# Patient Record
Sex: MALE | Race: WHITE | NOT HISPANIC OR LATINO | Employment: OTHER | URBAN - METROPOLITAN AREA
[De-identification: names, ages, dates, MRNs, and addresses within clinical notes are randomized per-mention and may not be internally consistent; named-entity substitution may affect disease eponyms.]

---

## 2023-05-05 DIAGNOSIS — R79.89 LOW TESTOSTERONE IN MALE: Primary | ICD-10-CM

## 2023-05-05 RX ORDER — TESTOSTERONE CYPIONATE 200 MG/ML
200 INJECTION, SOLUTION INTRAMUSCULAR
Qty: 5 ML | Refills: 1 | Status: SHIPPED | OUTPATIENT
Start: 2023-05-05 | End: 2023-05-16 | Stop reason: SDUPTHER

## 2023-05-05 RX ORDER — TESTOSTERONE CYPIONATE 200 MG/ML
200 INJECTION, SOLUTION INTRAMUSCULAR
COMMUNITY
End: 2023-05-05 | Stop reason: SDUPTHER

## 2023-05-16 DIAGNOSIS — R79.89 LOW TESTOSTERONE IN MALE: ICD-10-CM

## 2023-05-16 RX ORDER — TADALAFIL 5 MG/1
5 TABLET ORAL DAILY
COMMUNITY
Start: 2022-12-02 | End: 2023-05-16 | Stop reason: SDUPTHER

## 2023-05-16 RX ORDER — TESTOSTERONE CYPIONATE 200 MG/ML
200 INJECTION, SOLUTION INTRAMUSCULAR
Qty: 5 ML | Refills: 1 | Status: SHIPPED | OUTPATIENT
Start: 2023-05-16 | End: 2023-05-18 | Stop reason: SDUPTHER

## 2023-05-16 RX ORDER — TAMSULOSIN HYDROCHLORIDE 0.4 MG/1
0.4 CAPSULE ORAL NIGHTLY
COMMUNITY
End: 2023-05-16 | Stop reason: SDUPTHER

## 2023-05-16 RX ORDER — TADALAFIL 5 MG/1
5 TABLET ORAL DAILY
Qty: 10 TABLET | Refills: 2 | Status: SHIPPED | OUTPATIENT
Start: 2023-05-16 | End: 2023-09-01 | Stop reason: SDUPTHER

## 2023-05-16 RX ORDER — TAMSULOSIN HYDROCHLORIDE 0.4 MG/1
0.4 CAPSULE ORAL NIGHTLY
Qty: 90 CAPSULE | Refills: 1 | Status: SHIPPED | OUTPATIENT
Start: 2023-05-16 | End: 2023-12-29 | Stop reason: SDUPTHER

## 2023-05-18 ENCOUNTER — TELEPHONE (OUTPATIENT)
Dept: PRIMARY CARE | Facility: CLINIC | Age: 53
End: 2023-05-18
Payer: COMMERCIAL

## 2023-05-18 DIAGNOSIS — R79.89 LOW TESTOSTERONE IN MALE: ICD-10-CM

## 2023-05-18 RX ORDER — TESTOSTERONE CYPIONATE 200 MG/ML
INJECTION, SOLUTION INTRAMUSCULAR
Qty: 10 ML | Refills: 1 | Status: SHIPPED | OUTPATIENT
Start: 2023-05-18 | End: 2023-10-16

## 2023-09-01 DIAGNOSIS — R79.89 LOW TESTOSTERONE IN MALE: ICD-10-CM

## 2023-09-01 RX ORDER — TADALAFIL 5 MG/1
5 TABLET ORAL DAILY
Qty: 10 TABLET | Refills: 2 | Status: SHIPPED | OUTPATIENT
Start: 2023-09-01 | End: 2023-12-29 | Stop reason: SDUPTHER

## 2023-10-15 DIAGNOSIS — R79.89 LOW TESTOSTERONE IN MALE: ICD-10-CM

## 2023-10-16 RX ORDER — TESTOSTERONE CYPIONATE 200 MG/ML
INJECTION, SOLUTION INTRAMUSCULAR
Qty: 10 ML | Refills: 0 | Status: SHIPPED | OUTPATIENT
Start: 2023-10-16 | End: 2023-12-29 | Stop reason: SDUPTHER

## 2023-10-25 DIAGNOSIS — E78.5 HYPERLIPIDEMIA, UNSPECIFIED HYPERLIPIDEMIA TYPE: Primary | ICD-10-CM

## 2023-10-25 RX ORDER — ROSUVASTATIN CALCIUM 20 MG/1
20 TABLET, COATED ORAL DAILY
COMMUNITY
End: 2023-10-25 | Stop reason: SDUPTHER

## 2023-10-25 RX ORDER — ROSUVASTATIN CALCIUM 20 MG/1
20 TABLET, COATED ORAL DAILY
Qty: 90 TABLET | Refills: 1 | Status: SHIPPED | OUTPATIENT
Start: 2023-10-25 | End: 2023-12-29 | Stop reason: SDUPTHER

## 2023-12-29 ENCOUNTER — OFFICE VISIT (OUTPATIENT)
Dept: PRIMARY CARE | Facility: CLINIC | Age: 53
End: 2023-12-29
Payer: COMMERCIAL

## 2023-12-29 VITALS
HEIGHT: 70 IN | HEART RATE: 78 BPM | BODY MASS INDEX: 32.35 KG/M2 | TEMPERATURE: 97.5 F | OXYGEN SATURATION: 96 % | WEIGHT: 226 LBS | SYSTOLIC BLOOD PRESSURE: 112 MMHG | DIASTOLIC BLOOD PRESSURE: 68 MMHG

## 2023-12-29 DIAGNOSIS — L30.9 DERMATITIS: ICD-10-CM

## 2023-12-29 DIAGNOSIS — R79.89 LOW TESTOSTERONE IN MALE: ICD-10-CM

## 2023-12-29 DIAGNOSIS — E78.5 HYPERLIPIDEMIA, UNSPECIFIED HYPERLIPIDEMIA TYPE: Primary | ICD-10-CM

## 2023-12-29 DIAGNOSIS — J45.909 REACTIVE AIRWAY DISEASE WITHOUT COMPLICATION, UNSPECIFIED ASTHMA SEVERITY, UNSPECIFIED WHETHER PERSISTENT (HHS-HCC): ICD-10-CM

## 2023-12-29 PROBLEM — S39.012A STRAIN OF LUMBAR REGION: Status: ACTIVE | Noted: 2023-12-29

## 2023-12-29 PROBLEM — N40.0 BPH WITHOUT URINARY OBSTRUCTION: Status: ACTIVE | Noted: 2023-12-29

## 2023-12-29 PROBLEM — E29.1 TESTICULAR HYPOFUNCTION: Status: ACTIVE | Noted: 2023-12-29

## 2023-12-29 PROBLEM — E78.2 HYPERLIPEMIA, MIXED: Status: ACTIVE | Noted: 2023-12-29

## 2023-12-29 PROCEDURE — 99214 OFFICE O/P EST MOD 30 MIN: CPT | Performed by: FAMILY MEDICINE

## 2023-12-29 PROCEDURE — 1036F TOBACCO NON-USER: CPT | Performed by: FAMILY MEDICINE

## 2023-12-29 RX ORDER — TESTOSTERONE CYPIONATE 200 MG/ML
INJECTION, SOLUTION INTRAMUSCULAR
Qty: 10 ML | Refills: 0 | Status: SHIPPED | OUTPATIENT
Start: 2023-12-29 | End: 2024-05-07 | Stop reason: SDUPTHER

## 2023-12-29 RX ORDER — ROSUVASTATIN CALCIUM 20 MG/1
20 TABLET, COATED ORAL DAILY
Qty: 90 TABLET | Refills: 1 | Status: SHIPPED | OUTPATIENT
Start: 2023-12-29

## 2023-12-29 RX ORDER — TADALAFIL 5 MG/1
5 TABLET ORAL DAILY
Qty: 10 TABLET | Refills: 2 | Status: SHIPPED | OUTPATIENT
Start: 2023-12-29

## 2023-12-29 RX ORDER — TAMSULOSIN HYDROCHLORIDE 0.4 MG/1
0.4 CAPSULE ORAL NIGHTLY
Qty: 90 CAPSULE | Refills: 1 | Status: SHIPPED | OUTPATIENT
Start: 2023-12-29

## 2023-12-29 RX ORDER — ALBUTEROL SULFATE 90 UG/1
2 AEROSOL, METERED RESPIRATORY (INHALATION) EVERY 6 HOURS PRN
Qty: 18 G | Refills: 1 | Status: SHIPPED | OUTPATIENT
Start: 2023-12-29

## 2023-12-29 RX ORDER — TRIAMCINOLONE ACETONIDE 1 MG/G
OINTMENT TOPICAL 2 TIMES DAILY PRN
Qty: 15 G | Refills: 0 | Status: SHIPPED | OUTPATIENT
Start: 2023-12-29 | End: 2024-04-27

## 2023-12-29 ASSESSMENT — PAIN SCALES - GENERAL: PAINLEVEL: 0-NO PAIN

## 2023-12-29 NOTE — PROGRESS NOTES
"Subjective   Patient ID: Gabriel Esteves is a 53 y.o. male who presents for Med Refill.    HPI   BPH: Tamsulosin and Cialis have been helping with nocturia    Hypogonadism:  sees Dr. Espinosa with urology, testosterone prescribed through our office, gets labs done at [facility], has been overall within the normal range.  Does state that remembers CBC being slightly off but cannot recall exactly where.     Chest discomfort started after getting COVID almost two years ago, at times feels heaviness with deep breaths in the upper lung fields, able to exercise without issue.  Has had stress test which was unremarkable.  Had cardiac calcium score CT which did not show any abnormalities in the lungs.    Review of Systems  Negative unless noted in HPI    Objective   /68 (BP Location: Left arm, Patient Position: Sitting, BP Cuff Size: Large adult)   Pulse 78   Temp 36.4 °C (97.5 °F) (Temporal)   Ht 1.778 m (5' 10\")   Wt 103 kg (226 lb)   SpO2 96%   BMI 32.43 kg/m²     Physical Exam  Constitutional:       Appearance: He is normal weight.   Cardiovascular:      Rate and Rhythm: Normal rate and regular rhythm.      Heart sounds: No murmur heard.     No gallop.   Pulmonary:      Effort: Pulmonary effort is normal.      Breath sounds: Normal breath sounds.   Musculoskeletal:      Cervical back: Normal range of motion.   Neurological:      General: No focal deficit present.      Mental Status: He is alert and oriented to person, place, and time.   Psychiatric:         Mood and Affect: Mood normal.         Assessment/Plan   Problem List Items Addressed This Visit             ICD-10-CM    Hyperlipidemia - Primary E78.5     Lipids within reason on recent labs         Relevant Medications    rosuvastatin (Crestor) 20 mg tablet    Low testosterone in male R79.89     Continue testosterone supplementation  Discussed importance of monitoring hematocrit, will provide previous CBC  Continue current regimen         Relevant Medications    " tadalafil (Cialis) 5 mg tablet    tamsulosin (Flomax) 0.4 mg 24 hr capsule    testosterone cypionate (Depo-Testosterone) 200 mg/mL injection    Reactive airway disease without complication J45.909     Will trial Albuterol for chest discomfort  Had normal cardiac testing, unremarkable CT of the chest  Did discuss possible PFTs, will trial albuterol and then reevaluate         Relevant Medications    albuterol 90 mcg/actuation inhaler    Dermatitis L30.9     Uses triamcinolone for rare HSV lesions         Relevant Medications    triamcinolone (Kenalog) 0.1 % ointment

## 2023-12-29 NOTE — ASSESSMENT & PLAN NOTE
Will trial Albuterol for chest discomfort  Had normal cardiac testing, unremarkable CT of the chest  Did discuss possible PFTs, will trial albuterol and then reevaluate

## 2023-12-29 NOTE — ASSESSMENT & PLAN NOTE
Continue testosterone supplementation  Discussed importance of monitoring hematocrit, will provide previous CBC  Continue current regimen

## 2024-05-06 ENCOUNTER — TELEPHONE (OUTPATIENT)
Dept: PRIMARY CARE | Facility: CLINIC | Age: 54
End: 2024-05-06
Payer: COMMERCIAL

## 2024-05-07 DIAGNOSIS — R79.89 LOW TESTOSTERONE IN MALE: ICD-10-CM

## 2024-05-07 DIAGNOSIS — T75.3XXA MOTION SICKNESS, INITIAL ENCOUNTER: Primary | ICD-10-CM

## 2024-05-07 RX ORDER — SCOLOPAMINE TRANSDERMAL SYSTEM 1 MG/1
1 PATCH, EXTENDED RELEASE TRANSDERMAL
Qty: 10 PATCH | Refills: 0 | Status: SHIPPED | OUTPATIENT
Start: 2024-05-07 | End: 2024-07-06

## 2024-05-07 RX ORDER — TESTOSTERONE CYPIONATE 200 MG/ML
INJECTION, SOLUTION INTRAMUSCULAR
Qty: 10 ML | Refills: 0 | Status: SHIPPED | OUTPATIENT
Start: 2024-05-07

## 2024-08-09 DIAGNOSIS — E78.5 HYPERLIPIDEMIA, UNSPECIFIED HYPERLIPIDEMIA TYPE: ICD-10-CM

## 2024-08-11 RX ORDER — ROSUVASTATIN CALCIUM 20 MG/1
20 TABLET, COATED ORAL DAILY
Qty: 90 TABLET | Refills: 0 | Status: SHIPPED | OUTPATIENT
Start: 2024-08-11

## 2024-08-17 DIAGNOSIS — R79.89 LOW TESTOSTERONE IN MALE: ICD-10-CM

## 2024-08-20 RX ORDER — TESTOSTERONE CYPIONATE 200 MG/ML
INJECTION, SOLUTION INTRAMUSCULAR
Qty: 10 ML | Refills: 0 | Status: SHIPPED | OUTPATIENT
Start: 2024-08-20

## 2024-09-19 ENCOUNTER — APPOINTMENT (OUTPATIENT)
Dept: PRIMARY CARE | Facility: CLINIC | Age: 54
End: 2024-09-19
Payer: COMMERCIAL

## 2024-10-04 ENCOUNTER — APPOINTMENT (OUTPATIENT)
Dept: PRIMARY CARE | Facility: CLINIC | Age: 54
End: 2024-10-04
Payer: COMMERCIAL

## 2024-10-04 VITALS
TEMPERATURE: 97.5 F | HEART RATE: 72 BPM | HEIGHT: 70 IN | OXYGEN SATURATION: 95 % | SYSTOLIC BLOOD PRESSURE: 110 MMHG | WEIGHT: 228.6 LBS | DIASTOLIC BLOOD PRESSURE: 72 MMHG | BODY MASS INDEX: 32.73 KG/M2

## 2024-10-04 DIAGNOSIS — R79.89 LOW TESTOSTERONE IN MALE: ICD-10-CM

## 2024-10-04 DIAGNOSIS — N40.0 BPH WITHOUT URINARY OBSTRUCTION: ICD-10-CM

## 2024-10-04 DIAGNOSIS — E78.5 HYPERLIPIDEMIA, UNSPECIFIED HYPERLIPIDEMIA TYPE: Primary | ICD-10-CM

## 2024-10-04 PROBLEM — L30.9 DERMATITIS: Status: RESOLVED | Noted: 2023-12-29 | Resolved: 2024-10-04

## 2024-10-04 PROCEDURE — 1036F TOBACCO NON-USER: CPT | Performed by: FAMILY MEDICINE

## 2024-10-04 PROCEDURE — 3008F BODY MASS INDEX DOCD: CPT | Performed by: FAMILY MEDICINE

## 2024-10-04 PROCEDURE — 99214 OFFICE O/P EST MOD 30 MIN: CPT | Performed by: FAMILY MEDICINE

## 2024-10-04 RX ORDER — TADALAFIL 5 MG/1
5 TABLET ORAL DAILY
Qty: 10 TABLET | Refills: 2 | Status: SHIPPED | OUTPATIENT
Start: 2024-10-04

## 2024-10-04 RX ORDER — TAMSULOSIN HYDROCHLORIDE 0.4 MG/1
0.4 CAPSULE ORAL NIGHTLY
Qty: 90 CAPSULE | Refills: 1 | Status: SHIPPED | OUTPATIENT
Start: 2024-10-04

## 2024-10-04 RX ORDER — TESTOSTERONE CYPIONATE 200 MG/ML
200 INJECTION, SOLUTION INTRAMUSCULAR
Qty: 10 ML | Refills: 1 | Status: SHIPPED | OUTPATIENT
Start: 2024-10-04

## 2024-10-04 RX ORDER — ROSUVASTATIN CALCIUM 20 MG/1
20 TABLET, COATED ORAL DAILY
Qty: 90 TABLET | Refills: 1 | Status: SHIPPED | OUTPATIENT
Start: 2024-10-04

## 2024-10-04 ASSESSMENT — ENCOUNTER SYMPTOMS
LOSS OF SENSATION IN FEET: 0
DEPRESSION: 0
OCCASIONAL FEELINGS OF UNSTEADINESS: 0

## 2024-10-04 ASSESSMENT — PATIENT HEALTH QUESTIONNAIRE - PHQ9
SUM OF ALL RESPONSES TO PHQ9 QUESTIONS 1 AND 2: 0
2. FEELING DOWN, DEPRESSED OR HOPELESS: NOT AT ALL
1. LITTLE INTEREST OR PLEASURE IN DOING THINGS: NOT AT ALL

## 2024-10-04 ASSESSMENT — PAIN SCALES - GENERAL: PAINLEVEL: 0-NO PAIN

## 2024-10-04 NOTE — ASSESSMENT & PLAN NOTE
With known CAD (calcium score of 180 in 2022) advise pt of importance of continuing statin, staying on healthy diet and remain active  Continue to monitor actively

## 2024-10-04 NOTE — PROGRESS NOTES
"Subjective   Patient ID: Gabriel Esteves is a 54 y.o. male who presents for Hypertension.    HPI   Low T: has labs done which showed persistent low T, does feel like he is doing better with symptoms though and finds it effective    HLD: taking medication routinely but does miss dose at times, causes some muscle aches, tries to maintain healthy diet but travels a lot    BPH/ED: no issues or problems, PSA is normal  Review of Systems  Negative unless noted in HPI    Objective   /72   Pulse 72   Temp 36.4 °C (97.5 °F) (Temporal)   Ht 1.778 m (5' 10\")   Wt 104 kg (228 lb 9.6 oz)   SpO2 95%   BMI 32.80 kg/m²     Physical Exam  Constitutional:       Appearance: Normal appearance.   Cardiovascular:      Rate and Rhythm: Normal rate and regular rhythm.   Pulmonary:      Effort: Pulmonary effort is normal.      Breath sounds: Normal breath sounds.   Neurological:      General: No focal deficit present.      Mental Status: He is alert and oriented to person, place, and time.   Psychiatric:         Mood and Affect: Mood normal.         Assessment/Plan   Problem List Items Addressed This Visit             ICD-10-CM    BPH without urinary obstruction N40.0     Stable         Hyperlipidemia - Primary E78.5     With known CAD (calcium score of 180 in 2022) advise pt of importance of continuing statin, staying on healthy diet and remain active  Continue to monitor actively         Relevant Medications    rosuvastatin (Crestor) 20 mg tablet    Low testosterone in male R79.89     Increase dose to 200mg every 14 days (or 100mg weekly based on pt preference)  Recheck T about half way in between injections with goal result of 500-600  Discussed that may need to change to different formulation or follow up with urology if persistent low  Need CBC as well to check H/H  Discussed role of testosterone in HLD         Relevant Medications    tadalafil (Cialis) 5 mg tablet    tamsulosin (Flomax) 0.4 mg 24 hr capsule    testosterone " cypionate (Depo-Testosterone) 200 mg/mL injection

## 2024-10-04 NOTE — ASSESSMENT & PLAN NOTE
Increase dose to 200mg every 14 days (or 100mg weekly based on pt preference)  Recheck T about half way in between injections with goal result of 500-600  Discussed that may need to change to different formulation or follow up with urology if persistent low  Need CBC as well to check H/H  Discussed role of testosterone in HLD

## 2024-10-07 ENCOUNTER — TELEPHONE (OUTPATIENT)
Dept: PRIMARY CARE | Facility: CLINIC | Age: 54
End: 2024-10-07
Payer: COMMERCIAL

## 2024-10-09 DIAGNOSIS — R79.89 LOW TESTOSTERONE IN MALE: ICD-10-CM

## 2024-10-09 RX ORDER — TESTOSTERONE CYPIONATE 200 MG/ML
200 INJECTION, SOLUTION INTRAMUSCULAR
Qty: 10 ML | Refills: 1 | Status: SHIPPED | OUTPATIENT
Start: 2024-10-09

## 2024-10-10 DIAGNOSIS — R79.89 LOW TESTOSTERONE IN MALE: ICD-10-CM

## 2024-10-10 RX ORDER — TADALAFIL 5 MG/1
5 TABLET ORAL DAILY
Qty: 90 TABLET | Refills: 3 | Status: SHIPPED | OUTPATIENT
Start: 2024-10-10

## 2024-12-09 ENCOUNTER — APPOINTMENT (OUTPATIENT)
Dept: SURGERY | Facility: CLINIC | Age: 54
End: 2024-12-09
Payer: COMMERCIAL

## 2024-12-10 ENCOUNTER — OFFICE VISIT (OUTPATIENT)
Dept: SURGERY | Facility: CLINIC | Age: 54
End: 2024-12-10
Payer: COMMERCIAL

## 2024-12-10 ENCOUNTER — HOSPITAL ENCOUNTER (OUTPATIENT)
Facility: HOSPITAL | Age: 54
Setting detail: OUTPATIENT SURGERY
End: 2024-12-10
Attending: SURGERY | Admitting: SURGERY
Payer: COMMERCIAL

## 2024-12-10 VITALS
OXYGEN SATURATION: 96 % | WEIGHT: 236.4 LBS | SYSTOLIC BLOOD PRESSURE: 148 MMHG | BODY MASS INDEX: 33.92 KG/M2 | HEART RATE: 70 BPM | DIASTOLIC BLOOD PRESSURE: 84 MMHG

## 2024-12-10 DIAGNOSIS — Z01.818 PRE-OP EXAMINATION: Primary | ICD-10-CM

## 2024-12-10 DIAGNOSIS — K43.9 SUPRAUMBILICAL HERNIA WITHOUT GANGRENE AND WITHOUT OBSTRUCTION: ICD-10-CM

## 2024-12-10 PROCEDURE — 99203 OFFICE O/P NEW LOW 30 MIN: CPT | Performed by: SURGERY

## 2024-12-10 RX ORDER — ENOXAPARIN SODIUM 300 MG/3ML
30 INJECTION INTRAVENOUS; SUBCUTANEOUS ONCE
OUTPATIENT
Start: 2024-12-10 | End: 2024-12-10

## 2024-12-10 RX ORDER — CEFAZOLIN SODIUM 2 G/100ML
2 INJECTION, SOLUTION INTRAVENOUS ONCE
OUTPATIENT
Start: 2024-12-10 | End: 2024-12-10

## 2024-12-10 NOTE — PROGRESS NOTES
General Surgery History and Physical    Referring Provider:  Sandy Ovalle MD  No ref. provider found     Chief Complaint:  Chief Complaint   Patient presents with    New Patient Visit     Eval hernia        History of Present Illness:  Gabriel Esteves is a 54 y.o. adult who presents with bulge above umbilicus   Has had the bulge for about 2 yrs   Recently, it started to bother him with pain/discomfort when doing lifting/exercise   Pops out when standing, goes back when lying   No obstruction symptoms   Came for surgery evaluation for concern of hernia       Past Medical History:  No past medical history on file.     Past Surgical History:  Past Surgical History:   Procedure Laterality Date    OTHER SURGICAL HISTORY  11/21/2020    Shoulder arthroscopy        Medications:  Current Outpatient Medications   Medication Instructions    albuterol 90 mcg/actuation inhaler 2 puffs, inhalation, Every 6 hours PRN    rosuvastatin (CRESTOR) 20 mg, oral, Daily    tadalafil (CIALIS) 5 mg, oral, Daily    tamsulosin (FLOMAX) 0.4 mg, oral, Nightly    testosterone cypionate (DEPO-TESTOSTERONE) 200 mg, intramuscular, Every 14 days        Allergies:  No Known Allergies     Family History:  No family history on file.     Social History:  Social History     Socioeconomic History    Marital status: Single   Tobacco Use    Smoking status: Never    Smokeless tobacco: Never   Substance and Sexual Activity    Alcohol use: Not Currently    Drug use: Never        Review of Systems:  A complete 12 point review of systems was performed. Please see scanned questionnaire and is otherwise negative except as noted in the history of present illness.    Vital Signs:  Vitals:    12/10/24 1154   BP: 148/84   Pulse: 70   SpO2: 96%      Body mass index is 33.92 kg/m².      Physical Exam:  General: No acute distress.   Neuro: Alert and oriented ×3. Follows commands.  Face: Atraumatic, no visible skin lesion.   Head: Atraumatic  Eyes: Pupils equal reactive to  "light. Extraocular motions intact.  Ears: Hears normal speaking voice.  Mouth, Nose, Throat: Mucous membranes moist.  Normal dentition.  Neck: Supple. No visible masses.  Breast: Not examined.   Chest: No appreciable scars or masses.   Lung: Patent airway and no labored breath.   Vascular: Palpable radial pulses bilaterally.  Abdomen: Soft, NT,ND. Reducible bulge about 4-5cm above umbilicus, about 2-3cm in size, defect about 1.5cm.   Rectal and Perianal: Not examined.   Genitourinary: Not examined.   Musculoskeletal: Moves all extremities.  Normal range of motion.  Extremities: No cyanosis. No edema.   Lymphatic: No palpable lymph nodes.  Skin: No rashes or lesions.  Psychological: Normal affect      Laboratory Values:  CBC: No results found for: \"WBC\", \"RBC\", \"HGB\", \"HCT\", \"PLT\"    RFP: No results found for: \"GLUF\", \"NA\", \"K\", \"CL\", \"CO2\", \"BUN\", \"CREATININE\", \"CALCIUM\", \"MG\", \"PHOS\"     LFTs: No results found for: \"PROT\", \"ALBUMIN\", \"BILITOT\", \"BILIDIR\", \"ALKPHOS\", \"AST\", \"ALT\"         Imaging:  I have personally reviewed the images and the radiologist's report.  No results found.    Had cardiac stress test in 6/2022        Assessment:  This is a 54 y.o. adult who presents with follow conditions:   Supraumbilical/epigastric hernia: reducible, defect about 2cm     Plan:  I discussed treatment options and recommended open hernia repair with possible Ventralex mesh. Patient understood the risks of surgery include but are not limited to infection, bleeding, postoperative pain, hernia recurrence, possible mesh related complications and intra-abdominal injury which may require additional surgery to fix it, risks of anesthesia, mortality in rare/extreme case.  Patient wished to proceed with the surgery.       He will need cbc, cmp and EKG before surgery. No formal PAT is needed.     Patient was advised to call or come to the emergency department if signs or symptoms of obstruction or strangulation.        Ann Marie Ceja MD " [facility]  General Surgery  Office: [phone]  Fax:     [phone]  12:33 PM   12/10/24

## 2024-12-16 ENCOUNTER — TELEPHONE (OUTPATIENT)
Dept: PREADMISSION TESTING | Facility: HOSPITAL | Age: 54
End: 2024-12-16
Payer: COMMERCIAL

## 2024-12-16 RX ORDER — NAPROXEN 250 MG/1
250 TABLET ORAL 2 TIMES DAILY PRN
COMMUNITY

## 2024-12-16 RX ORDER — DEXTROMETHORPHAN HYDROBROMIDE, GUAIFENESIN 5; 100 MG/5ML; MG/5ML
650 LIQUID ORAL EVERY 8 HOURS PRN
COMMUNITY

## 2024-12-16 RX ORDER — ASPIRIN 81 MG/1
81 TABLET ORAL DAILY
COMMUNITY

## 2024-12-16 NOTE — TELEPHONE ENCOUNTER
Pre-procedure PAT phone assessment completed. Pt states his surgery date has been changed to 1/3/24. Pre-operative and medication instructions reviewed with patient. Instructed to hold Aleve and prophylactic aspirin 1 week prior to surgery. Patient verbalizes understanding of instructions.  SURGERY PRE-OPERATIVE INSTRUCTIONS    *You will receive a phone call the day before your procedure  after 2pm, (or the Friday before your surgery if scheduled on a Monday.) Generally the hospital will be calling you with this information after that time.    *You are not to eat after midnight the night before the surgery. You may have up to 13 ounces of clear liquids up until 2 hours prior to arriving to the hospital. The exception is with medications you were instructed to take day of surgery.    *You may take tylenol for pain/discomfort as needed.     *Stop taking all aspirin products, ibuprofen (motrin/advil), naproxen (aleve/naprosyn) for one week prior to surgery.    *Stop taking all vitamins and supplements one week prior to surgery.     *You should not have alcoholic beverages for 24 hours before surgery.     *You should not smoke 24 hours prior to surgery.     *To help prevent surgical infections bathe/shower with Dial soap the evening before surgery.    *You can wear deodorant but no lotion, powder, or perfume/cologne. You should remove all make-up and nail polish at home.    *If you wear glasses, please bring a case for the glasses with you.    *You will be asked to remove dentures and contacts.     *Please leave all valuables at home.    *You should wear loose, comfortable clothing that will accommodate bandages and/or casts.    *You should notify your doctor of any change in your condition (fever, cold, rash, etc). Surgery may need to be re-scheduled until a time you are in better health.    *A responsible adult is required to accompany you to and from the hospital if you are receiving anesthesia or a sedative.  Patients are not permitted to drive for 24 hours after anesthesia.     *You can use the [facility] parking if you wish.     *If you have any further questions please call [phone].

## 2024-12-17 DIAGNOSIS — K43.9 SUPRAUMBILICAL HERNIA WITHOUT GANGRENE AND WITHOUT OBSTRUCTION: Primary | ICD-10-CM

## 2024-12-18 ENCOUNTER — TELEPHONE (OUTPATIENT)
Dept: PRIMARY CARE | Facility: CLINIC | Age: 54
End: 2024-12-18
Payer: COMMERCIAL

## 2024-12-18 NOTE — TELEPHONE ENCOUNTER
Spoke with pt, informed of message below. Verbalized understanding.     ----- Message from Sandy Ovalle sent at 12/17/2024 10:48 AM EST -----  Regarding: RE: General surgery referral  Referral for dr. Cerna placed in chart  ----- Message -----  From: Larissa Katherine Leal  Sent: 12/17/2024  10:47 AM EST  To: Sandy Ovalle MD  Subject: General surgery referral                         Pt called in stating that he saw a general surgeon who wants to do a hernia repair surgery. He is not sure if he agrees and would like a second opinion. He would like a referral to see general surgeon Dr. Higinio Cerna.

## 2024-12-24 DIAGNOSIS — R79.89 LOW TESTOSTERONE IN MALE: ICD-10-CM

## 2024-12-24 RX ORDER — TESTOSTERONE CYPIONATE 200 MG/ML
INJECTION, SOLUTION INTRAMUSCULAR
Qty: 10 ML | Refills: 1 | Status: SHIPPED | OUTPATIENT
Start: 2024-12-24

## 2025-01-06 ENCOUNTER — APPOINTMENT (OUTPATIENT)
Dept: SURGERY | Facility: CLINIC | Age: 55
End: 2025-01-06
Payer: COMMERCIAL

## 2025-01-20 ENCOUNTER — OFFICE VISIT (OUTPATIENT)
Dept: SURGERY | Facility: CLINIC | Age: 55
End: 2025-01-20
Payer: COMMERCIAL

## 2025-01-20 VITALS
HEIGHT: 70 IN | DIASTOLIC BLOOD PRESSURE: 91 MMHG | TEMPERATURE: 97 F | WEIGHT: 241 LBS | BODY MASS INDEX: 34.5 KG/M2 | HEART RATE: 74 BPM | SYSTOLIC BLOOD PRESSURE: 153 MMHG

## 2025-01-20 DIAGNOSIS — K43.9 SUPRAUMBILICAL HERNIA WITHOUT GANGRENE AND WITHOUT OBSTRUCTION: Primary | ICD-10-CM

## 2025-01-20 PROCEDURE — 3008F BODY MASS INDEX DOCD: CPT | Performed by: SURGERY

## 2025-01-20 PROCEDURE — 1036F TOBACCO NON-USER: CPT | Performed by: SURGERY

## 2025-01-20 PROCEDURE — 99213 OFFICE O/P EST LOW 20 MIN: CPT | Performed by: SURGERY

## 2025-01-20 RX ORDER — CEFAZOLIN SODIUM 2 G/100ML
2 INJECTION, SOLUTION INTRAVENOUS ONCE
OUTPATIENT
Start: 2025-01-20 | End: 2025-01-20

## 2025-01-20 ASSESSMENT — ENCOUNTER SYMPTOMS: DEPRESSION: 0

## 2025-01-20 ASSESSMENT — PAIN SCALES - GENERAL: PAINLEVEL_OUTOF10: 0-NO PAIN

## 2025-01-20 NOTE — PROGRESS NOTES
History Of Present Illness  Gabriel Esteves is a 54 y.o. adult presenting with supraumbilical bulge that he is known about for several months.  One of our surgical techs here at [facility] referred him to me.  No prior abdominal surgeries.  He is a very serious and consistent .  Does a lot of heavy lifting with free weights.  He otherwise pretty healthy.  He is on some medicine for HLD and BPH.     Past Medical History  Past Medical History:   Diagnosis Date    BPH (benign prostatic hyperplasia)     Gout     HLD (hyperlipidemia)     Low testosterone level in male     Supraumbilical hernia without gangrene and without obstruction        Surgical History  Past Surgical History:   Procedure Laterality Date    OTHER SURGICAL HISTORY  11/21/2020    Shoulder arthroscopy        Social History  Gabriel reports that Gabriel has never smoked. Gabriel has never used smokeless tobacco. Gabriel reports current alcohol use. Gabriel reports that Gabriel does not use drugs.    Family History  No family history on file.     Allergies  Patient has no known allergies.    Review of Systems  Constitutional: no weight loss, no fevers, no malaise  HEENT: negative  Neck: negative  Pulmonary: no SOB, no cough  CV: no chest pain, otherwise negative  GI: no pain, no diarrhea, no bloody stools, no constipation  : no hematuria, retention.  MS: no aches/pains  Neurologic: negative  Skin: no rashes, lesions  HEME: no bleeding tendency, no bruising  Psych: no mood issues    Physical Exam  General: well appearing, no acute distress, well nourished  HEENT: normal  Neck: supple  Pulmonary: lungs clear to auscultation bilaterally  CV: RR, S1S2, no murmurs.  Pulses palpable and equal.  Good capillary refill  Abdomen: soft, nondistended.  There is an obvious supraumbilical hernia easily reducible.  Edges of the fascia are at least 3 cm apart.  He also has a small umbilical hernia  : grossly normal external genitalia  MS: grossly normal  Neurologic: alert and  oriented, strength/sensation intact  Skin: non jaundiced, no lesions  Psych: mood appropriate    Last Recorded Vitals  There were no vitals taken for this visit.    Relevant Results           Assessment/Plan       Impression: Patient with congenital supra umbilical and umbilical hernias.  Would prefer to approach this with MIS techniques to better evaluate the linea alba along the entire epigastrium.    -I carefully reviewed pathophysiology of incisional hernias with patient  -Risks of eventual incarceration/strangulation, worsening symptomatology described  -Rationale for surgical repair outlined  -Techniques of repair described (laparoscopy vs open)  -Risks of surgery addressed (bleeding, infection, bladder/bowel injury, chronic pain syndromes, recurrence, etc)  -Expected recovery timeline conveyed (2-4 weeks of limited activity, work restrictions, need for pain medications, etc)  -Other option would be watchful waiting, avoidance of activity that worsens symptoms  -Patient has indicated to me a verbal understanding of all this information and would like to proceed with MIS robotic/laparoscopic preperitoneal mesh repair of the hernia.    -Office will schedule at patient convenience       I spent 40 minutes in the professional and overall care of this patient.      Higinio Cerna MD

## 2025-01-20 NOTE — LETTER
January 20, 2025     Sandy Ovalle MD  [address]  [facility] [address]    Patient: Gabriel Esteves   YOB: 1970   Date of Visit: 1/20/2025       Dear Dr. Sandy Ovalle MD:    Thank you for referring Gabriel Esteves to me for evaluation. Below are my notes for this consultation.  If you have questions, please do not hesitate to call me. I look forward to following your patient along with you.       Sincerely,     Higinio Cerna MD      CC: No Recipients  ______________________________________________________________________________________    History Of Present Illness  Gabriel Esteves is a 54 y.o. adult presenting with supraumbilical bulge that he is known about for several months.  One of our surgical techs here at [facility] referred him to me.  No prior abdominal surgeries.  He is a very serious and consistent .  Does a lot of heavy lifting with free weights.  He otherwise pretty healthy.  He is on some medicine for HLD and BPH.     Past Medical History  Past Medical History:   Diagnosis Date   • BPH (benign prostatic hyperplasia)    • Gout    • HLD (hyperlipidemia)    • Low testosterone level in male    • Supraumbilical hernia without gangrene and without obstruction        Surgical History  Past Surgical History:   Procedure Laterality Date   • OTHER SURGICAL HISTORY  11/21/2020    Shoulder arthroscopy        Social History  Gabriel reports that Gabriel has never smoked. Gabriel has never used smokeless tobacco. Gabriel reports current alcohol use. Gabriel reports that Gabriel does not use drugs.    Family History  No family history on file.     Allergies  Patient has no known allergies.    Review of Systems  Constitutional: no weight loss, no fevers, no malaise  HEENT: negative  Neck: negative  Pulmonary: no SOB, no cough  CV: no chest pain, otherwise negative  GI: no pain, no diarrhea, no bloody stools, no constipation  : no hematuria, retention.  MS: no aches/pains  Neurologic: negative  Skin: no  rashes, lesions  HEME: no bleeding tendency, no bruising  Psych: no mood issues    Physical Exam  General: well appearing, no acute distress, well nourished  HEENT: normal  Neck: supple  Pulmonary: lungs clear to auscultation bilaterally  CV: RR, S1S2, no murmurs.  Pulses palpable and equal.  Good capillary refill  Abdomen: soft, nondistended.  There is an obvious supraumbilical hernia easily reducible.  Edges of the fascia are at least 3 cm apart.  He also has a small umbilical hernia  : grossly normal external genitalia  MS: grossly normal  Neurologic: alert and oriented, strength/sensation intact  Skin: non jaundiced, no lesions  Psych: mood appropriate    Last Recorded Vitals  There were no vitals taken for this visit.    Relevant Results           Assessment/Plan       Impression: Patient with congenital supra umbilical and umbilical hernias.  Would prefer to approach this with MIS techniques to better evaluate the linea alba along the entire epigastrium.    -I carefully reviewed pathophysiology of incisional hernias with patient  -Risks of eventual incarceration/strangulation, worsening symptomatology described  -Rationale for surgical repair outlined  -Techniques of repair described (laparoscopy vs open)  -Risks of surgery addressed (bleeding, infection, bladder/bowel injury, chronic pain syndromes, recurrence, etc)  -Expected recovery timeline conveyed (2-4 weeks of limited activity, work restrictions, need for pain medications, etc)  -Other option would be watchful waiting, avoidance of activity that worsens symptoms  -Patient has indicated to me a verbal understanding of all this information and would like to proceed with MIS robotic/laparoscopic preperitoneal mesh repair of the hernia.    -Office will schedule at patient convenience       I spent 40 minutes in the professional and overall care of this patient.      Higinio Cerna MD

## 2025-01-21 ENCOUNTER — APPOINTMENT (OUTPATIENT)
Dept: SURGERY | Facility: CLINIC | Age: 55
End: 2025-01-21
Payer: COMMERCIAL

## 2025-01-22 ENCOUNTER — APPOINTMENT (OUTPATIENT)
Dept: SURGERY | Facility: CLINIC | Age: 55
End: 2025-01-22
Payer: COMMERCIAL

## 2025-03-14 ENCOUNTER — HOSPITAL ENCOUNTER (OUTPATIENT)
Facility: HOSPITAL | Age: 55
Setting detail: OUTPATIENT SURGERY
Discharge: HOME | End: 2025-03-14
Attending: SURGERY | Admitting: SURGERY
Payer: COMMERCIAL

## 2025-03-14 ENCOUNTER — ANESTHESIA EVENT (OUTPATIENT)
Dept: OPERATING ROOM | Facility: HOSPITAL | Age: 55
End: 2025-03-14
Payer: COMMERCIAL

## 2025-03-14 ENCOUNTER — ANESTHESIA (OUTPATIENT)
Dept: OPERATING ROOM | Facility: HOSPITAL | Age: 55
End: 2025-03-14
Payer: COMMERCIAL

## 2025-03-14 VITALS
HEART RATE: 86 BPM | OXYGEN SATURATION: 94 % | WEIGHT: 240.08 LBS | RESPIRATION RATE: 20 BRPM | HEIGHT: 69 IN | BODY MASS INDEX: 35.56 KG/M2 | SYSTOLIC BLOOD PRESSURE: 135 MMHG | TEMPERATURE: 97 F | DIASTOLIC BLOOD PRESSURE: 76 MMHG

## 2025-03-14 DIAGNOSIS — K43.9 SUPRAUMBILICAL HERNIA WITHOUT GANGRENE AND WITHOUT OBSTRUCTION: Primary | ICD-10-CM

## 2025-03-14 PROCEDURE — 7100000009 HC PHASE TWO TIME - INITIAL BASE CHARGE: Performed by: SURGERY

## 2025-03-14 PROCEDURE — 7100000010 HC PHASE TWO TIME - EACH INCREMENTAL 1 MINUTE: Performed by: SURGERY

## 2025-03-14 PROCEDURE — C1781 MESH (IMPLANTABLE): HCPCS | Performed by: SURGERY

## 2025-03-14 PROCEDURE — 7100000002 HC RECOVERY ROOM TIME - EACH INCREMENTAL 1 MINUTE: Performed by: SURGERY

## 2025-03-14 PROCEDURE — 2500000001 HC RX 250 WO HCPCS SELF ADMINISTERED DRUGS (ALT 637 FOR MEDICARE OP): Performed by: ANESTHESIOLOGY

## 2025-03-14 PROCEDURE — 49593 RPR AA HRN 1ST 3-10 RDC: CPT | Performed by: SURGERY

## 2025-03-14 PROCEDURE — 2720000007 HC OR 272 NO HCPCS: Performed by: SURGERY

## 2025-03-14 PROCEDURE — 2500000005 HC RX 250 GENERAL PHARMACY W/O HCPCS: Performed by: ANESTHESIOLOGIST ASSISTANT

## 2025-03-14 PROCEDURE — 2780000003 HC OR 278 NO HCPCS: Performed by: SURGERY

## 2025-03-14 PROCEDURE — 7100000001 HC RECOVERY ROOM TIME - INITIAL BASE CHARGE: Performed by: SURGERY

## 2025-03-14 PROCEDURE — 2500000004 HC RX 250 GENERAL PHARMACY W/ HCPCS (ALT 636 FOR OP/ED): Performed by: ANESTHESIOLOGY

## 2025-03-14 PROCEDURE — 3700000002 HC GENERAL ANESTHESIA TIME - EACH INCREMENTAL 1 MINUTE: Performed by: SURGERY

## 2025-03-14 PROCEDURE — 3600000018 HC OR TIME - INITIAL BASE CHARGE - PROCEDURE LEVEL SIX: Performed by: SURGERY

## 2025-03-14 PROCEDURE — 2500000004 HC RX 250 GENERAL PHARMACY W/ HCPCS (ALT 636 FOR OP/ED): Mod: JZ | Performed by: ANESTHESIOLOGIST ASSISTANT

## 2025-03-14 PROCEDURE — 3700000001 HC GENERAL ANESTHESIA TIME - INITIAL BASE CHARGE: Performed by: SURGERY

## 2025-03-14 PROCEDURE — 2500000005 HC RX 250 GENERAL PHARMACY W/O HCPCS: Performed by: ANESTHESIOLOGY

## 2025-03-14 PROCEDURE — 2500000005 HC RX 250 GENERAL PHARMACY W/O HCPCS: Performed by: SURGERY

## 2025-03-14 PROCEDURE — 2500000004 HC RX 250 GENERAL PHARMACY W/ HCPCS (ALT 636 FOR OP/ED): Performed by: SURGERY

## 2025-03-14 PROCEDURE — 3600000017 HC OR TIME - EACH INCREMENTAL 1 MINUTE - PROCEDURE LEVEL SIX: Performed by: SURGERY

## 2025-03-14 DEVICE — LAPAROSCOPIC SELF-FIXATING MESH POLYESTER WITH POLYLACTIC ACID GRIPS AND COLLAGEN FILM
Type: IMPLANTABLE DEVICE | Site: ABDOMEN | Status: FUNCTIONAL
Brand: PROGRIP

## 2025-03-14 RX ORDER — PROPOFOL 10 MG/ML
INJECTION, EMULSION INTRAVENOUS AS NEEDED
Status: DISCONTINUED | OUTPATIENT
Start: 2025-03-14 | End: 2025-03-14

## 2025-03-14 RX ORDER — ALBUTEROL SULFATE 0.83 MG/ML
2.5 SOLUTION RESPIRATORY (INHALATION) ONCE AS NEEDED
Status: DISCONTINUED | OUTPATIENT
Start: 2025-03-14 | End: 2025-03-14 | Stop reason: HOSPADM

## 2025-03-14 RX ORDER — DEXMEDETOMIDINE IN 0.9 % NACL 20 MCG/5ML
SYRINGE (ML) INTRAVENOUS AS NEEDED
Status: DISCONTINUED | OUTPATIENT
Start: 2025-03-14 | End: 2025-03-14

## 2025-03-14 RX ORDER — BUPIVACAINE HYDROCHLORIDE 5 MG/ML
INJECTION, SOLUTION PERINEURAL AS NEEDED
Status: DISCONTINUED | OUTPATIENT
Start: 2025-03-14 | End: 2025-03-14 | Stop reason: HOSPADM

## 2025-03-14 RX ORDER — CEFAZOLIN 1 G/1
INJECTION, POWDER, FOR SOLUTION INTRAVENOUS AS NEEDED
Status: DISCONTINUED | OUTPATIENT
Start: 2025-03-14 | End: 2025-03-14

## 2025-03-14 RX ORDER — LIDOCAINE HYDROCHLORIDE 40 MG/ML
SOLUTION TOPICAL AS NEEDED
Status: DISCONTINUED | OUTPATIENT
Start: 2025-03-14 | End: 2025-03-14

## 2025-03-14 RX ORDER — OXYCODONE HYDROCHLORIDE 5 MG/1
5 TABLET ORAL EVERY 4 HOURS PRN
Status: DISCONTINUED | OUTPATIENT
Start: 2025-03-14 | End: 2025-03-14 | Stop reason: HOSPADM

## 2025-03-14 RX ORDER — SODIUM CHLORIDE, SODIUM LACTATE, POTASSIUM CHLORIDE, CALCIUM CHLORIDE 600; 310; 30; 20 MG/100ML; MG/100ML; MG/100ML; MG/100ML
INJECTION, SOLUTION INTRAVENOUS CONTINUOUS PRN
Status: DISCONTINUED | OUTPATIENT
Start: 2025-03-14 | End: 2025-03-14

## 2025-03-14 RX ORDER — ACETAMINOPHEN 325 MG/1
650 TABLET ORAL EVERY 4 HOURS PRN
Status: DISCONTINUED | OUTPATIENT
Start: 2025-03-14 | End: 2025-03-14 | Stop reason: HOSPADM

## 2025-03-14 RX ORDER — SCOPOLAMINE 1 MG/3D
1 PATCH, EXTENDED RELEASE TRANSDERMAL
Status: DISCONTINUED | OUTPATIENT
Start: 2025-03-14 | End: 2025-03-14 | Stop reason: HOSPADM

## 2025-03-14 RX ORDER — CEFAZOLIN SODIUM 2 G/100ML
2 INJECTION, SOLUTION INTRAVENOUS ONCE
Status: DISCONTINUED | OUTPATIENT
Start: 2025-03-14 | End: 2025-03-14 | Stop reason: HOSPADM

## 2025-03-14 RX ORDER — MIDAZOLAM HYDROCHLORIDE 1 MG/ML
INJECTION INTRAMUSCULAR; INTRAVENOUS AS NEEDED
Status: DISCONTINUED | OUTPATIENT
Start: 2025-03-14 | End: 2025-03-14

## 2025-03-14 RX ORDER — POLYETHYLENE GLYCOL 3350 17 G/17G
17 POWDER, FOR SOLUTION ORAL DAILY PRN
Qty: 10 PACKET | Refills: 0 | Status: SHIPPED | OUTPATIENT
Start: 2025-03-14

## 2025-03-14 RX ORDER — ROCURONIUM BROMIDE 10 MG/ML
INJECTION, SOLUTION INTRAVENOUS AS NEEDED
Status: DISCONTINUED | OUTPATIENT
Start: 2025-03-14 | End: 2025-03-14

## 2025-03-14 RX ORDER — GLYCOPYRROLATE 0.2 MG/ML
INJECTION INTRAMUSCULAR; INTRAVENOUS AS NEEDED
Status: DISCONTINUED | OUTPATIENT
Start: 2025-03-14 | End: 2025-03-14

## 2025-03-14 RX ORDER — ONDANSETRON HYDROCHLORIDE 2 MG/ML
4 INJECTION, SOLUTION INTRAVENOUS ONCE AS NEEDED
Status: COMPLETED | OUTPATIENT
Start: 2025-03-14 | End: 2025-03-14

## 2025-03-14 RX ORDER — ONDANSETRON HYDROCHLORIDE 2 MG/ML
INJECTION, SOLUTION INTRAVENOUS AS NEEDED
Status: DISCONTINUED | OUTPATIENT
Start: 2025-03-14 | End: 2025-03-14

## 2025-03-14 RX ORDER — IPRATROPIUM BROMIDE 0.5 MG/2.5ML
500 SOLUTION RESPIRATORY (INHALATION) ONCE
Status: DISCONTINUED | OUTPATIENT
Start: 2025-03-14 | End: 2025-03-14 | Stop reason: HOSPADM

## 2025-03-14 RX ORDER — DROPERIDOL 2.5 MG/ML
0.62 INJECTION, SOLUTION INTRAMUSCULAR; INTRAVENOUS ONCE AS NEEDED
Status: DISCONTINUED | OUTPATIENT
Start: 2025-03-14 | End: 2025-03-14 | Stop reason: HOSPADM

## 2025-03-14 RX ORDER — SODIUM CHLORIDE, SODIUM LACTATE, POTASSIUM CHLORIDE, CALCIUM CHLORIDE 600; 310; 30; 20 MG/100ML; MG/100ML; MG/100ML; MG/100ML
100 INJECTION, SOLUTION INTRAVENOUS CONTINUOUS
Status: ACTIVE | OUTPATIENT
Start: 2025-03-14 | End: 2025-03-14

## 2025-03-14 RX ORDER — IBUPROFEN 600 MG/1
600 TABLET ORAL EVERY 6 HOURS PRN
Qty: 20 TABLET | Refills: 0 | Status: SHIPPED | OUTPATIENT
Start: 2025-03-14

## 2025-03-14 RX ORDER — LIDOCAINE HYDROCHLORIDE 10 MG/ML
0.1 INJECTION, SOLUTION EPIDURAL; INFILTRATION; INTRACAUDAL; PERINEURAL ONCE
Status: DISCONTINUED | OUTPATIENT
Start: 2025-03-14 | End: 2025-03-14 | Stop reason: HOSPADM

## 2025-03-14 RX ORDER — HYDROMORPHONE HYDROCHLORIDE 1 MG/ML
INJECTION, SOLUTION INTRAMUSCULAR; INTRAVENOUS; SUBCUTANEOUS AS NEEDED
Status: DISCONTINUED | OUTPATIENT
Start: 2025-03-14 | End: 2025-03-14

## 2025-03-14 RX ORDER — SODIUM CHLORIDE 0.9 G/100ML
INJECTION, SOLUTION IRRIGATION AS NEEDED
Status: DISCONTINUED | OUTPATIENT
Start: 2025-03-14 | End: 2025-03-14 | Stop reason: HOSPADM

## 2025-03-14 RX ORDER — FENTANYL CITRATE 50 UG/ML
INJECTION, SOLUTION INTRAMUSCULAR; INTRAVENOUS AS NEEDED
Status: DISCONTINUED | OUTPATIENT
Start: 2025-03-14 | End: 2025-03-14

## 2025-03-14 RX ORDER — OXYCODONE HYDROCHLORIDE 5 MG/1
5 TABLET ORAL EVERY 6 HOURS PRN
Qty: 12 TABLET | Refills: 0 | Status: SHIPPED | OUTPATIENT
Start: 2025-03-14

## 2025-03-14 RX ADMIN — LIDOCAINE HYDROCHLORIDE 4 ML: 40 SOLUTION TOPICAL at 07:38

## 2025-03-14 RX ADMIN — ROCURONIUM BROMIDE 20 MG: 10 INJECTION INTRAVENOUS at 08:10

## 2025-03-14 RX ADMIN — SCOPOLAMINE 1 PATCH: 1.5 PATCH, EXTENDED RELEASE TRANSDERMAL at 13:46

## 2025-03-14 RX ADMIN — DEXAMETHASONE SODIUM PHOSPHATE 8 MG: 4 INJECTION, SOLUTION INTRAMUSCULAR; INTRAVENOUS at 07:46

## 2025-03-14 RX ADMIN — HYDROMORPHONE HYDROCHLORIDE 0.5 MG: 1 INJECTION, SOLUTION INTRAMUSCULAR; INTRAVENOUS; SUBCUTANEOUS at 09:23

## 2025-03-14 RX ADMIN — HYDROMORPHONE HYDROCHLORIDE 0.5 MG: 1 INJECTION, SOLUTION INTRAMUSCULAR; INTRAVENOUS; SUBCUTANEOUS at 09:55

## 2025-03-14 RX ADMIN — Medication 3 L/MIN: at 09:34

## 2025-03-14 RX ADMIN — PROPOFOL 200 MG: 10 INJECTION, EMULSION INTRAVENOUS at 07:35

## 2025-03-14 RX ADMIN — ONDANSETRON 4 MG: 2 INJECTION, SOLUTION INTRAMUSCULAR; INTRAVENOUS at 12:25

## 2025-03-14 RX ADMIN — HYDROMORPHONE HYDROCHLORIDE 0.5 MG: 1 INJECTION, SOLUTION INTRAMUSCULAR; INTRAVENOUS; SUBCUTANEOUS at 10:11

## 2025-03-14 RX ADMIN — HYDROMORPHONE HYDROCHLORIDE 0.5 MG: 1 INJECTION, SOLUTION INTRAMUSCULAR; INTRAVENOUS; SUBCUTANEOUS at 09:47

## 2025-03-14 RX ADMIN — OXYCODONE HYDROCHLORIDE 5 MG: 5 TABLET ORAL at 10:38

## 2025-03-14 RX ADMIN — ROCURONIUM BROMIDE 10 MG: 10 INJECTION INTRAVENOUS at 08:45

## 2025-03-14 RX ADMIN — SUGAMMADEX 200 MG: 100 INJECTION, SOLUTION INTRAVENOUS at 09:21

## 2025-03-14 RX ADMIN — SODIUM CHLORIDE, POTASSIUM CHLORIDE, SODIUM LACTATE AND CALCIUM CHLORIDE: 600; 310; 30; 20 INJECTION, SOLUTION INTRAVENOUS at 07:33

## 2025-03-14 RX ADMIN — GLYCOPYRROLATE 0.2 MG: 0.2 INJECTION INTRAMUSCULAR; INTRAVENOUS at 07:31

## 2025-03-14 RX ADMIN — CEFAZOLIN 2 G: 1 INJECTION, POWDER, FOR SOLUTION INTRAMUSCULAR; INTRAVENOUS at 07:36

## 2025-03-14 RX ADMIN — FENTANYL CITRATE 100 MCG: 50 INJECTION, SOLUTION INTRAMUSCULAR; INTRAVENOUS at 07:35

## 2025-03-14 RX ADMIN — HYDROMORPHONE HYDROCHLORIDE 0.5 MG: 1 INJECTION, SOLUTION INTRAMUSCULAR; INTRAVENOUS; SUBCUTANEOUS at 09:11

## 2025-03-14 RX ADMIN — ROCURONIUM BROMIDE 10 MG: 10 INJECTION INTRAVENOUS at 08:29

## 2025-03-14 RX ADMIN — ONDANSETRON 4 MG: 2 INJECTION, SOLUTION INTRAMUSCULAR; INTRAVENOUS at 09:11

## 2025-03-14 RX ADMIN — MIDAZOLAM HYDROCHLORIDE 2 MG: 1 INJECTION, SOLUTION INTRAMUSCULAR; INTRAVENOUS at 07:31

## 2025-03-14 RX ADMIN — Medication 8 MCG: at 09:24

## 2025-03-14 RX ADMIN — ROCURONIUM BROMIDE 20 MG: 10 INJECTION INTRAVENOUS at 09:00

## 2025-03-14 RX ADMIN — ROCURONIUM BROMIDE 80 MG: 10 INJECTION INTRAVENOUS at 07:36

## 2025-03-14 ASSESSMENT — PAIN DESCRIPTION - LOCATION
LOCATION: ABDOMEN

## 2025-03-14 ASSESSMENT — PAIN SCALES - GENERAL
PAINLEVEL_OUTOF10: 3
PAINLEVEL_OUTOF10: 7
PAINLEVEL_OUTOF10: 7
PAINLEVEL_OUTOF10: 3
PAINLEVEL_OUTOF10: 0 - NO PAIN
PAINLEVEL_OUTOF10: 4
PAINLEVEL_OUTOF10: 5 - MODERATE PAIN
PAINLEVEL_OUTOF10: 3
PAINLEVEL_OUTOF10: 0 - NO PAIN
PAINLEVEL_OUTOF10: 9
PAINLEVEL_OUTOF10: 3
PAINLEVEL_OUTOF10: 3
PAINLEVEL_OUTOF10: 6
PAINLEVEL_OUTOF10: 5 - MODERATE PAIN
PAINLEVEL_OUTOF10: 3
PAINLEVEL_OUTOF10: 0 - NO PAIN
PAINLEVEL_OUTOF10: 3
PAINLEVEL_OUTOF10: 5 - MODERATE PAIN
PAINLEVEL_OUTOF10: 9

## 2025-03-14 ASSESSMENT — PAIN - FUNCTIONAL ASSESSMENT
PAIN_FUNCTIONAL_ASSESSMENT: 0-10
PAIN_FUNCTIONAL_ASSESSMENT: WONG-BAKER FACES
PAIN_FUNCTIONAL_ASSESSMENT: 0-10

## 2025-03-14 ASSESSMENT — COLUMBIA-SUICIDE SEVERITY RATING SCALE - C-SSRS
1. IN THE PAST MONTH, HAVE YOU WISHED YOU WERE DEAD OR WISHED YOU COULD GO TO SLEEP AND NOT WAKE UP?: NO
2. HAVE YOU ACTUALLY HAD ANY THOUGHTS OF KILLING YOURSELF?: NO
6. HAVE YOU EVER DONE ANYTHING, STARTED TO DO ANYTHING, OR PREPARED TO DO ANYTHING TO END YOUR LIFE?: NO

## 2025-03-14 NOTE — NURSING NOTE
0934: Patient arrival to PACU, report received/care assumed.    0942: Family updated via Epic messenger    0947: Dilaudid 0.5 mg IV PRN given per order  0955: Dilaudid 0.5 mg IV PRN given per order  1011: Dilaudid 0.5 mg IV PRN given per order    1034: Patient tolerating ginger ale and pudding    1038: Oxycodone 5 mg PO PRN given per order    1041: Patient's family updated via telephone    1119: Incentive spirometer education provided, patient verbalized understanding/demonstrated use.    1211: Family updated via telephone. Patient repositioned to recliner at bedside.    1225: Zofran 4 mg IV given per order    1313: Patient assisted to urinate     1330: Patient meets criteria for phase 2    1346: Dr. Thacker notified about patient's request for Scopolamine patch. Scopolamine patch placed behind patient's left ear, per order.

## 2025-03-14 NOTE — ANESTHESIA PREPROCEDURE EVALUATION
"                                                      Pre-Anesthesia Evaluation      Gabriel Esteves is a 54 y.o. adult who presents for the above mentioned procedure due to Supraumbilical hernia without gangrene and without obstruction [K43.9]       Past Medical History:   Diagnosis Date    BPH (benign prostatic hyperplasia)     Gout     HLD (hyperlipidemia)     Low testosterone level in male     Supraumbilical hernia without gangrene and without obstruction      Past Surgical History:   Procedure Laterality Date    COLONOSCOPY      OTHER SURGICAL HISTORY  11/21/2020    Shoulder arthroscopy    WISDOM TOOTH EXTRACTION       Social History   Gabriel reports that Gabriel has never smoked. Gabriel has never used smokeless tobacco. Gabriel reports current alcohol use. Gabriel reports that Gabriel does not use drugs.    No Known Allergies  Current Outpatient Medications   Medication Instructions    acetaminophen (TYLENOL 8 HOUR) 650 mg, Every 8 hours PRN    albuterol 90 mcg/actuation inhaler 2 puffs, inhalation, Every 6 hours PRN    aspirin 81 mg, Daily    naproxen (NAPROSYN) 250 mg, 2 times daily PRN    rosuvastatin (CRESTOR) 20 mg, oral, Daily    tadalafil (CIALIS) 5 mg, oral, Daily    tamsulosin (FLOMAX) 0.4 mg, oral, Nightly    testosterone cypionate (Depo-Testosterone) 200 mg/mL injection Inject 1.5mL every 14 days     No results found for: \"WBC\", \"HGB\", \"HCT\", \"PLT\", \"TSH\", \"HGBA1C\", \"GLUCOSE\", \"NA\", \"K\", \"CL\", \"CREATININE\", \"BUN\", \"EGFR\", \"CO2\", \"AST\", \"ALT\", \"INR\", \"ABO\", \"STAPHMRSASCR\"  No results found for: \"STAPHMRSASCR\"  No results found for this or any previous visit (from the past 4464 hours).  No results found for this or any previous visit from the past 1095 days.     No results found for: \"EF\"  Visit Vitals  /82 (BP Location: Right arm, Patient Position: Lying)   Pulse 69   Temp 36 °C (96.8 °F) (Skin)   Resp 18   Ht 1.753 m (5' 9\")   Wt 109 kg (240 lb 1.3 oz)   SpO2 95%   BMI 35.45 kg/m²   Smoking Status Never " "  BSA 2.3 m²     No results found for: \"PREGTESTUR\", \"PREGSERUM\", \"HCG\", \"HCGQUANT\"                         Patient: Gabriel Esteves    Procedure Information       Date/Time: 03/14/25 0730    Procedure: Robot Assisted Umbilical Hernia Repair; Mesh Placement (Abdomen)    Location: [facility] OR 08 / [facility] OR    Surgeons: Higinio Cerna MD            Relevant Problems   Cardiac   (+) Hyperlipidemia      /Renal   (+) BPH without urinary obstruction       Clinical information reviewed:   Tobacco  Allergies  Meds   Med Hx  Surg Hx   Fam Hx  Soc Hx        NPO Detail:  NPO/Void Status  Carbohydrate Drink Given Prior to Surgery? : N  Date of Last Liquid: 03/14/25  Time of Last Liquid: 0349  Date of Last Solid: 03/13/25  Time of Last Solid: 2100  Last Intake Type: Clear fluids  Time of Last Void: 0631         Physical Exam    Airway  Mallampati: III     Cardiovascular   Rhythm: regular  Rate: normal     Dental    Pulmonary    Abdominal            Anesthesia Plan    History of general anesthesia?: yes  History of complications of general anesthesia?: no    ASA 2     general     intravenous induction   Anesthetic plan and risks discussed with patient.    Plan discussed with CAA.      "

## 2025-03-14 NOTE — ANESTHESIA PROCEDURE NOTES
Airway  Date/Time: 3/14/2025 7:38 AM  Urgency: elective    Airway not difficult    Staffing  Performed: JESE   Authorized by: Philip Thacker MD    Performed by: JESE Haskins  Patient location during procedure: OR    Indications and Patient Condition  Indications for airway management: anesthesia and airway protection  Spontaneous Ventilation: absent  Sedation level: deep  Preoxygenated: yes  Patient position: sniffing  MILS not maintained throughout  Mask difficulty assessment: 2 - vent by mask + OA or adjuvant +/- NMBA  Planned trial extubation    Final Airway Details  Final airway type: endotracheal airway      Successful airway: ETT  Cuffed: yes   Successful intubation technique: video laryngoscopy  Facilitating devices/methods: intubating stylet  Endotracheal tube insertion site: oral  Blade: Pete  Blade size: #4  ETT size (mm): 7.5  Cormack-Lehane Classification: grade IIa - partial view of glottis  Placement verified by: capnometry and palpation of cuff   Inital cuff pressure (cm H2O): 0  Cuff volume (mL): 10  Measured from: lips  ETT to lips (cm): 22  Number of attempts at approach: 1    Additional Comments  Lips and teeth in pre-anesthetic condition

## 2025-03-14 NOTE — OP NOTE
Robot Assisted Umbilical Hernia Repair; Mesh Placement Operative Note     Date: 3/14/2025  OR Location: [facility] A OR    Name: Gabriel Esteves, : 1970, Age: 54 y.o., MRN: [id number], Sex: adult    Diagnosis  Pre-op Diagnosis      * Supraumbilical hernia without gangrene and without obstruction [K43.9] Post-op Diagnosis     * Supraumbilical hernia without gangrene and without obstruction [K43.9]     Procedures  Robot Assisted Umbilical Hernia Repair; Mesh Placement  [id number] - [place] RPR AA HERNIA 1ST 3-10 CM REDUCIBLE      Surgeons      * Higinio Cerna - Primary    Resident/Fellow/Other Assistant:  Surgeons and Role:  * No surgeons found with a matching role *    Staff:   Scrub Person: Halle  Circulator: Kaitlin Nance Circulator: Grace  Scrub Person: Alexis    Anesthesia Staff: Anesthesiologist: MD ENOC Reese-AA: JESE Haskins  RODRICK: Benedict Joiner    Procedure Summary  Anesthesia: Anesthesia type not filed in the log.  ASA: II  Estimated Blood Loss: 10mL  Intra-op Medications:   Administrations occurring from 0730 to 0930 on 25:   Medication Name Total Dose   BUPivacaine HCl (Marcaine) 0.5 % (5 mg/mL) injection 24 mL   sodium chloride 0.9 % irrigation solution 1,000 mL   ceFAZolin (Ancef) vial 1 g 2 g   dexAMETHasone (Decadron) injection 4 mg/mL 8 mg   fentaNYL (Sublimaze) injection 50 mcg/mL 100 mcg   glycopyrrolate (Robinul) injection 0.2 mg   HYDROmorphone (Dilaudid) injection 1 mg/mL 0.5 mg   lactated Ringer's infusion Cannot be calculated   lidocaine (LTA Kit) for intubation 4 mL   midazolam PF (Versed) injection 1 mg/mL 2 mg   ondansetron (Zofran) 2 mg/mL injection 4 mg   propofol (Diprivan) injection 10 mg/mL 200 mg   rocuronium (ZeMuron) 50 mg/5 mL injection 140 mg              Anesthesia Record               Intraprocedure I/O Totals       None           Specimen: No specimens collected              Drains and/or Catheters: * None in log *    Tourniquet Times:         Implants:  Implants        Type Name Action Serial No.      Surgical Mesh Sling Implant MESH, PROGRIP LAP, 10 X 15 CM, [facility] - [id number] Implanted               Findings: Numerous periumbilical fascial defects containing preperitoneal fat with a total length of 5.5 cm    Indications: Gabriel Esteves is an 54 y.o. adult who is having surgery for Supraumbilical hernia without gangrene and without obstruction [K43.9].     The patient was seen in the preoperative area. The risks, benefits, complications, treatment options, non-operative alternatives, expected recovery and outcomes were discussed with the patient. The possibilities of reaction to medication, pulmonary aspiration, injury to surrounding structures, bleeding, recurrent infection, the need for additional procedures, failure to diagnose a condition, and creating a complication requiring transfusion or operation were discussed with the patient. The patient concurred with the proposed plan, giving informed consent.  The site of surgery was properly noted/marked if necessary per policy. The patient has been actively warmed in preoperative area. Preoperative antibiotics have been ordered and given within 1 hours of incision. Venous thrombosis prophylaxis have been ordered including bilateral sequential compression devices    Procedure Details: Patient was brought to the OR placed supine.  We did our timeout to confirm patient procedure.  Antibiotics were administered and general anesthesia given through an endotracheal tube.  Left arm was tucked.  We prepped and draped the abdominal wall sterilely.  Injected Marcaine made a subxiphoid left midline incision with scalpel.  Fascia was incised entered peritoneal cavity balloon port and insufflated.  Robotic 30 degree camera is introduced.  We then placed 3 left lateral 8 mm robotic ports.  We docked the robot and then went to the console.  Patient with an umbilical and then supraumbilical defects were noted.  These all contain  preperitoneal fat.  I opted to do a preperitoneal repair.  The peritoneal flap was developed starting at the lateral edge of the left rectus muscle and I worked my way across the midline to far beyond the lateral edge of the right rectus muscle.  In doing so we reduced preperitoneal fat from several fascial defects involving the umbilicus in the supraumbilical area.  Total length of the hernia defects was 5.5 cm.  Once my preperitoneal pocket was developed and went ahead and repaired the fascia and also plicated some more cephalad diastases recti with running #1 nonabsorbable V-Loc suture.  I then introduced a 10 x 15 ProGrip mesh.  This was placed symmetrically with self affixing side against the abdominal wall.  I further secured it with some interrupted 3-0 Vicryl sutures.  We then closed our peritoneal flap with a running 3 0 strata fix PDS suture.  All the needles were accounted for and removed.  We undocked the robot and then removed our ports after doing a tap block.  The subxiphoid fascia was closed with 0 Vicryl.  Skin incisions were closed with 4-0 Monocryl and Dermabond the patient went to the recovery room in satisfactory condition.  Complications:  None; patient tolerated the procedure well.    Disposition: PACU - hemodynamically stable.  Condition: stable                 Additional Details:     Attending Attestation: I was present for the entire procedure.    Higinio Cerna  Phone Number: [phone]

## 2025-03-14 NOTE — ANESTHESIA POSTPROCEDURE EVALUATION
Patient: Gabriel Esteves    Procedure Summary       Date: 03/14/25 Room / Location: [facility] OR 08 / Virtual [facility] OR    Anesthesia Start: 0732 Anesthesia Stop: 0940    Procedure: Robot Assisted Umbilical Hernia Repair; Mesh Placement (Abdomen) Diagnosis:       Supraumbilical hernia without gangrene and without obstruction      (Supraumbilical hernia without gangrene and without obstruction [K43.9])    Surgeons: Higinio Cerna MD Responsible Provider: Philip Thacker MD    Anesthesia Type: general ASA Status: 2            Anesthesia Type: general    Vitals Value Taken Time   /71 03/14/25 1016   Temp 36.2 °C (97.2 °F) 03/14/25 0934   Pulse 86 03/14/25 1017   Resp 13 03/14/25 1000   SpO2 90 % 03/14/25 1017   Vitals shown include unfiled device data.    Anesthesia Post Evaluation    Patient location during evaluation: bedside  Patient participation: complete - patient participated  Level of consciousness: awake  Pain management: adequate  Airway patency: patent  Cardiovascular status: acceptable  Respiratory status: acceptable  Hydration status: acceptable  Postoperative Nausea and Vomiting: none        There were no known notable events for this encounter.

## 2025-03-14 NOTE — H&P
History Of Present Illness  Gabriel Esteves is a 54 y.o. adult presenting with supraumbilical bulge that he is known about for several months.  One of our surgical techs here at [facility] referred him to me.  No prior abdominal surgeries.  He is a very serious and consistent .  Does a lot of heavy lifting with free weights.  He otherwise pretty healthy.  He is on some medicine for HLD and BPH.     Past Medical History  Medical History        Past Medical History:   Diagnosis Date    BPH (benign prostatic hyperplasia)      Gout      HLD (hyperlipidemia)      Low testosterone level in male      Supraumbilical hernia without gangrene and without obstruction              Surgical History  Surgical History         Past Surgical History:   Procedure Laterality Date    OTHER SURGICAL HISTORY   11/21/2020     Shoulder arthroscopy            Social History  Gabriel reports that Gabriel has never smoked. Gabriel has never used smokeless tobacco. Gabriel reports current alcohol use. Gabriel reports that Gabriel does not use drugs.     Family History  Family History   No family history on file.        Allergies  Patient has no known allergies.     Review of Systems  Constitutional: no weight loss, no fevers, no malaise  HEENT: negative  Neck: negative  Pulmonary: no SOB, no cough  CV: no chest pain, otherwise negative  GI: no pain, no diarrhea, no bloody stools, no constipation  : no hematuria, retention.  MS: no aches/pains  Neurologic: negative  Skin: no rashes, lesions  HEME: no bleeding tendency, no bruising  Psych: no mood issues     Physical Exam  General: well appearing, no acute distress, well nourished  HEENT: normal  Neck: supple  Pulmonary: lungs clear to auscultation bilaterally  CV: RR, S1S2, no murmurs.  Pulses palpable and equal.  Good capillary refill  Abdomen: soft, nondistended.  There is an obvious supraumbilical hernia easily reducible.  Edges of the fascia are at least 3 cm apart.  He also has a small umbilical  hernia  : grossly normal external genitalia  MS: grossly normal  Neurologic: alert and oriented, strength/sensation intact  Skin: non jaundiced, no lesions  Psych: mood appropriate     Last Recorded Vitals  There were no vitals taken for this visit.     Relevant Results                 Assessment/Plan  Impression: Patient with congenital supra umbilical and umbilical hernias.  Would prefer to approach this with MIS techniques to better evaluate the linea alba along the entire epigastrium.     -I carefully reviewed pathophysiology of incisional hernias with patient  -Risks of eventual incarceration/strangulation, worsening symptomatology described  -Rationale for surgical repair outlined  -Techniques of repair described (laparoscopy vs open)  -Risks of surgery addressed (bleeding, infection, bladder/bowel injury, chronic pain syndromes, recurrence, etc)  -Expected recovery timeline conveyed (2-4 weeks of limited activity, work restrictions, need for pain medications, etc)  -Other option would be watchful waiting, avoidance of activity that worsens symptoms  -Patient has indicated to me a verbal understanding of all this information and would like to proceed with MIS robotic/laparoscopic preperitoneal mesh repair of the hernia.    -Office will schedule at patient convenience

## 2025-03-18 ASSESSMENT — PAIN SCALES - GENERAL: PAINLEVEL_OUTOF10: 3

## 2025-04-04 ENCOUNTER — OFFICE VISIT (OUTPATIENT)
Dept: SURGERY | Facility: CLINIC | Age: 55
End: 2025-04-04
Payer: COMMERCIAL

## 2025-04-04 VITALS
HEART RATE: 92 BPM | BODY MASS INDEX: 32.5 KG/M2 | HEIGHT: 70 IN | SYSTOLIC BLOOD PRESSURE: 119 MMHG | DIASTOLIC BLOOD PRESSURE: 87 MMHG | WEIGHT: 227 LBS

## 2025-04-04 DIAGNOSIS — K43.9 VENTRAL HERNIA WITHOUT OBSTRUCTION OR GANGRENE: Primary | ICD-10-CM

## 2025-04-04 PROCEDURE — 1036F TOBACCO NON-USER: CPT | Performed by: SURGERY

## 2025-04-04 PROCEDURE — 3008F BODY MASS INDEX DOCD: CPT | Performed by: SURGERY

## 2025-04-04 PROCEDURE — 99213 OFFICE O/P EST LOW 20 MIN: CPT | Performed by: SURGERY

## 2025-04-04 ASSESSMENT — PAIN SCALES - GENERAL: PAINLEVEL_OUTOF10: 0-NO PAIN

## 2025-04-04 NOTE — PROGRESS NOTES
Assessment/Plan   Excellent outcome 3 weeks out from robo hernia repair. Timeline for resumption of strenuous exercises reviewed. Will see me 4-6 weeks    Subjective   Doing well following recent robo repair ventral hernia. Min pain       Objective     Physical Exam  NAD  A&Ox3  Non icteric  CTA  RR  Abdomen soft min tender. Wounds clean, intact. No hernia recurrence  Extremities warm, well perfused         Relevant Results      No results found for this or any previous visit (from the past 24 hours).        I spent 25 minutes in the professional and overall care of this patient.      Higinio Cerna MD

## 2025-04-04 NOTE — LETTER
April 4, 2025     Sandy Ovalle MD  [address]  [address]  [facility] [address]    Patient: Gabriel Esteves   YOB: 1970   Date of Visit: 4/4/2025       Dear Dr. Sandy Ovalle MD:    Thank you for referring Gabriel Esteves to me for evaluation. Below are my notes for this consultation.  If you have questions, please do not hesitate to call me. I look forward to following your patient along with you.       Sincerely,     Higinio Cerna MD      CC: No Recipients  ______________________________________________________________________________________    Assessment/Plan  Excellent outcome 3 weeks out from robo hernia repair. Timeline for resumption of strenuous exercises reviewed. Will see me 4-6 weeks    Subjective  Doing well following recent robo repair ventral hernia. Min pain       Objective    Physical Exam  NAD  A&Ox3  Non icteric  CTA  RR  Abdomen soft min tender. Wounds clean, intact. No hernia recurrence  Extremities warm, well perfused         Relevant Results      No results found for this or any previous visit (from the past 24 hours).        I spent 25 minutes in the professional and overall care of this patient.      Higinio Cerna MD

## 2025-05-02 ENCOUNTER — OFFICE VISIT (OUTPATIENT)
Dept: SURGERY | Facility: CLINIC | Age: 55
End: 2025-05-02
Payer: COMMERCIAL

## 2025-05-02 VITALS
WEIGHT: 235 LBS | HEART RATE: 83 BPM | BODY MASS INDEX: 34.13 KG/M2 | DIASTOLIC BLOOD PRESSURE: 81 MMHG | TEMPERATURE: 99 F | SYSTOLIC BLOOD PRESSURE: 126 MMHG

## 2025-05-02 DIAGNOSIS — K42.0 INCARCERATED UMBILICAL HERNIA: Primary | ICD-10-CM

## 2025-05-02 PROCEDURE — 1036F TOBACCO NON-USER: CPT | Performed by: SURGERY

## 2025-05-02 PROCEDURE — 99213 OFFICE O/P EST LOW 20 MIN: CPT | Performed by: SURGERY

## 2025-05-02 ASSESSMENT — ENCOUNTER SYMPTOMS: DEPRESSION: 0

## 2025-05-02 NOTE — LETTER
May 2, 2025     Sandy Ovalle MD  [address]  [address]  [facility] [address]    Patient: Gabriel Esteves   YOB: 1970   Date of Visit: 5/2/2025       Dear Dr. Sandy Ovalle MD:    Thank you for referring Gabriel Esteves to me for evaluation. Below are my notes for this consultation.  If you have questions, please do not hesitate to call me. I look forward to following your patient along with you.       Sincerely,     Higinio Cerna MD      CC: No Recipients  ______________________________________________________________________________________    Gabriel Esteves is a 54 y.o. adult on day 0 of admission presenting with No Principal Problem: There is no principal problem currently on the Problem List. Please update the Problem List and refresh..    Assessment/Plan  Excellent outcome following robotic assisted repair of a large periumbilical hernia.  He can start now to resume unrestricted activities in the gym with lifting.  He will follow-up with me in 1 year or as needed    Subjective  Gabriel is doing well.  Just had some questions about resuming workout routines and going scuba diving       Objective    Physical Exam  NAD  A&Ox3  Non icteric  CTA  RR  Abdomen soft min tender. Wounds clean, intact.  No clinical evidence of hernia recurrence  Extremities warm, well perfused     Last Recorded Vitals  Blood pressure 126/81, pulse 83, temperature 37.2 °C (99 °F), temperature source Temporal, weight 107 kg (235 lb).  Intake/Output last 3 Shifts:  No intake/output data recorded.    Relevant Results    Scheduled medications  Scheduled Medications[1]  Continuous medications  Continuous Medications[2]  PRN medications  PRN Medications[3]    No results found for this or any previous visit (from the past 24 hours).        I spent 25 minutes in the professional and overall care of this patient.      Higinio Cerna MD           [1]  [2]  [3]       [1]  [2]  [3]

## 2025-05-02 NOTE — PROGRESS NOTES
Gabriel Esteves is a 54 y.o. adult on day 0 of admission presenting with No Principal Problem: There is no principal problem currently on the Problem List. Please update the Problem List and refresh..    Assessment/Plan   Excellent outcome following robotic assisted repair of a large periumbilical hernia.  He can start now to resume unrestricted activities in the gym with lifting.  He will follow-up with me in 1 year or as needed    Subjective   Gabriel is doing well.  Just had some questions about resuming workout routines and going scuba diving       Objective     Physical Exam  NAD  A&Ox3  Non icteric  CTA  RR  Abdomen soft min tender. Wounds clean, intact.  No clinical evidence of hernia recurrence  Extremities warm, well perfused     Last Recorded Vitals  Blood pressure 126/81, pulse 83, temperature 37.2 °C (99 °F), temperature source Temporal, weight 107 kg (235 lb).  Intake/Output last 3 Shifts:  No intake/output data recorded.    Relevant Results    Scheduled medications  Scheduled Medications[1]  Continuous medications  Continuous Medications[2]  PRN medications  PRN Medications[3]    No results found for this or any previous visit (from the past 24 hours).        I spent 25 minutes in the professional and overall care of this patient.      Higinio Cerna MD           [1] [2] [3]

## 2025-06-09 DIAGNOSIS — R79.89 LOW TESTOSTERONE IN MALE: ICD-10-CM

## 2025-06-09 DIAGNOSIS — T75.3XXA MOTION SICKNESS, INITIAL ENCOUNTER: ICD-10-CM

## 2025-06-09 RX ORDER — SCOPOLAMINE 1 MG/3D
PATCH, EXTENDED RELEASE TRANSDERMAL
Qty: 10 PATCH | Refills: 0 | Status: SHIPPED | OUTPATIENT
Start: 2025-06-09

## 2025-06-09 RX ORDER — TESTOSTERONE CYPIONATE 200 MG/ML
INJECTION, SOLUTION INTRAMUSCULAR
Qty: 10 ML | Refills: 0 | Status: SHIPPED | OUTPATIENT
Start: 2025-06-09

## 2025-07-19 DIAGNOSIS — E78.5 HYPERLIPIDEMIA, UNSPECIFIED HYPERLIPIDEMIA TYPE: ICD-10-CM

## 2025-07-21 RX ORDER — ROSUVASTATIN CALCIUM 20 MG/1
20 TABLET, COATED ORAL DAILY
Qty: 90 TABLET | Refills: 0 | Status: SHIPPED | OUTPATIENT
Start: 2025-07-21

## (undated) DEVICE — DRIVER, NEEDLE, MEGA SUTURE CUT, DAVINCI XI

## (undated) DEVICE — DRAPE, ARM XI

## (undated) DEVICE — ABDOMINAL BINDER, 3-PANEL, 9, 72 - 84""

## (undated) DEVICE — ADHESIVE, SKIN, DERMABOND ADVANCED, 15CM, PEN-STYLE

## (undated) DEVICE — BINDER, ABDOMINAL, SMALL/MEDIUM, 9 X 30-45 IN

## (undated) DEVICE — SUTURE, STRATAFIX, 3-0 6IN, SPIRAL PDS PLUS, RB-1, VIOLET

## (undated) DEVICE — SUTURE, V-LOC PBT, 1 DVC, GS-21, 18 IN, BLUE, NONABS

## (undated) DEVICE — SUTURE, STRATAFIX, SPIRAL PDS PLUS, 2-0, 12IN, 30CM, CT-1, VIOLET

## (undated) DEVICE — TROCAR SYSTEM, BALLOON, KII GELPORT, 12 X 100MM

## (undated) DEVICE — SUTURE, VICRYL, 0, 27 IN, UR-6, VIOLET

## (undated) DEVICE — SUTURE, STRATAFIX, 3-0 SPIRAL PDS PLUS, 15CM SH VIOLET

## (undated) DEVICE — SEAL, UNIVERSAL, 5-12MM

## (undated) DEVICE — OBTURATOR, BLADELESS , SU

## (undated) DEVICE — DRAPE, COLUMN, DAVINCI XI

## (undated) DEVICE — FORCEPS, BIPOLAR FENESTRATED XI

## (undated) DEVICE — DRAPE, SHEET, THREE QUARTER, FAN FOLD, 57 X 77 IN

## (undated) DEVICE — SUTURE, VICRYL PLUS 3-0, SH, 27IN

## (undated) DEVICE — Device

## (undated) DEVICE — TUBE SET, PNEUMOLAR HEATED, SMOKE EVACU, HIGH-FLOW

## (undated) DEVICE — COVER, TIP HOT SHEARS ENDOWRIST

## (undated) DEVICE — SUTURE, STRATAFIX, SPIRAL PDS PLUS, 0, 9IN, 36CM, CT-1, VIOLET

## (undated) DEVICE — GLOVE, SURGICAL, PROTEXIS PI ORTHO, 7.0, PF, LF

## (undated) DEVICE — CLEAN KIT, ANTIFOG SCOPE, SEE SHARP 150MM

## (undated) DEVICE — SUTURE, VICRYL PLUS, 0, 27IN, UR-6, VIOLET, BRAIDED

## (undated) DEVICE — SUTURE, MONOCRYL, 4-0, 18 IN, PS2, UNDYED